# Patient Record
Sex: FEMALE | Race: WHITE | Employment: OTHER | URBAN - METROPOLITAN AREA
[De-identification: names, ages, dates, MRNs, and addresses within clinical notes are randomized per-mention and may not be internally consistent; named-entity substitution may affect disease eponyms.]

---

## 2023-08-07 ENCOUNTER — OFFICE VISIT (OUTPATIENT)
Dept: ENT CLINIC | Age: 68
End: 2023-08-07
Payer: MEDICARE

## 2023-08-07 ENCOUNTER — PROCEDURE VISIT (OUTPATIENT)
Dept: AUDIOLOGY | Age: 68
End: 2023-08-07
Payer: MEDICARE

## 2023-08-07 VITALS
HEIGHT: 63 IN | DIASTOLIC BLOOD PRESSURE: 86 MMHG | SYSTOLIC BLOOD PRESSURE: 162 MMHG | WEIGHT: 169 LBS | BODY MASS INDEX: 29.95 KG/M2 | TEMPERATURE: 97.8 F | HEART RATE: 75 BPM

## 2023-08-07 DIAGNOSIS — H90.3 BILATERAL HIGH FREQUENCY SENSORINEURAL HEARING LOSS: Chronic | ICD-10-CM

## 2023-08-07 DIAGNOSIS — H90.3 SENSORINEURAL HEARING LOSS, BILATERAL: Primary | ICD-10-CM

## 2023-08-07 DIAGNOSIS — R42 DIZZINESS: ICD-10-CM

## 2023-08-07 DIAGNOSIS — H93.13 TINNITUS OF BOTH EARS: ICD-10-CM

## 2023-08-07 DIAGNOSIS — H93.13 SUBJECTIVE TINNITUS OF BOTH EARS: Primary | Chronic | ICD-10-CM

## 2023-08-07 DIAGNOSIS — H93.8X2 SENSATION OF FULLNESS IN LEFT EAR: ICD-10-CM

## 2023-08-07 PROCEDURE — 99203 OFFICE O/P NEW LOW 30 MIN: CPT | Performed by: OTOLARYNGOLOGY

## 2023-08-07 PROCEDURE — 92567 TYMPANOMETRY: CPT

## 2023-08-07 PROCEDURE — 92557 COMPREHENSIVE HEARING TEST: CPT

## 2023-08-07 PROCEDURE — 1123F ACP DISCUSS/DSCN MKR DOCD: CPT | Performed by: OTOLARYNGOLOGY

## 2023-08-07 RX ORDER — THYROID 90 MG/1
TABLET ORAL
COMMUNITY
Start: 2019-11-20

## 2023-08-07 ASSESSMENT — ENCOUNTER SYMPTOMS
SINUS PAIN: 0
RHINORRHEA: 0
SORE THROAT: 0

## 2023-08-07 NOTE — PROGRESS NOTES
Reynold Salcido   1955, 69 y.o. female   [id number]       Referring Provider: Marylu Humphreys MD  Referral Type: In an order in [address] [place] [address]    Reason for Visit: Evaluation of suspected change in hearing, tinnitus, or balance. ADULT AUDIOLOGIC EVALUATION      Reynold Salcido is a 69 y.o. female seen today, 8/7/2023 , for an initial audiologic evaluation. Patient was seen by Marylu Humphreys MD following today's evaluation. AUDIOLOGIC AND OTHER PERTINENT MEDICAL HISTORY:      Reynold Salcido reports having  muffled hearing, tinnitus bilaterally, and left ear fullness and ear ache that started 6 weeks ago. She notes hearing loss has been gradual since onset. Episodes of dizziness started roughly 2 weeks ago and have been lasting hours with spinning sensation, nausea and vomiting. She notes fluctuation in tinnitus and fullness when dizziness occurs. She denied otorrhea, history of noise exposure, history of head trauma, history of ear surgery, and family history of hearing loss    Date: 8/7/2023     IMPRESSIONS:      Today's results revealed sensorineural hearing loss bilaterally with asymmetry >15dB in the left ear at low frequency. Excellent speech understanding when in quiet. Tympanometry indicates normal middle ear function. Right, hypermobile tympanic membrane/eardrum in the left. Discussed test results and implications with patient. Discussed possible benefits of amplification. Discussed use of tinnitus management strategies. Follow medical recommendations of Marylu Humphreys MD.     ASSESSMENT AND FINDINGS:     Otoscopy unremarkable. RIGHT EAR:  Hearing Sensitivity: Normal sloping to severe sensorineural hearing loss   Speech Recognition Threshold: 15 dB HL  Word Recognition: Excellent 100%, based on NU-6 25-word list at 55 dBHL using recorded speech stimuli. Tympanometry: Normal peak pressure and compliance, Type A tympanogram, consistent with normal middle ear function.        LEFT EAR:  Hearing

## 2023-08-07 NOTE — PATIENT INSTRUCTIONS
You should return for a repeat hearing test in 6 and 12 months. You should return if your ears plug up with ear wax causing difficulty hearing or pressure. You should employ the tinnitus masking techniques and strategies we discussed, as needed. Bedside tinnitus masking devices (eg. a white noise machine, noise machine, noise americo on your cell phone, fan on in the room, radio with light music or tuned between stations to white noise static) can be very helpful. You should avoid exposure to excessively high levels of noise/sound and use hearing protection measures we discussed, as needed, if such exposure is unavoidable. I recommend that you use Lipoflavonoid Plus, (use brand name, not generic, for the first six months), for treatment of your tinnitus. This is available \"over the counter\" at your pharmacy. You should take two orally three times a day for the first 60 days, then one orally three times a day thereafter. Take this medication continuously for six months. If you have seen no improvement in your tinnitus after a full six months of use, you should consider cessation of this treatment. NO Q-TIPS IN THE EARS  You should never clean your ears with a Q-tip, cotton tipped applicator, Yessy pin, paper clip, or any other instrument. I recommend only use of one the several ear wax removal kits available \"over the counter\" (eg. [employer] or Murine, or [employer]) if you feel a need to try to remove ear wax. No other methods should be self used for this purpose as there is danger of injury to the ear and risk of irreparable and irreversible permanent hearing loss. Detail Level: Simple Additional Notes: Patient consent was obtained to proceed with the visit and recommended plan of care after discussion of all risks and benefits, including the risks of COVID-19 exposure. Render Risk Assessment In Note?: no Additional Notes: Pt counseled on toothpaste use as she recently switched brands.

## 2024-02-12 ENCOUNTER — PROCEDURE VISIT (OUTPATIENT)
Dept: AUDIOLOGY | Age: 69
End: 2024-02-12
Payer: MEDICARE

## 2024-02-12 ENCOUNTER — OFFICE VISIT (OUTPATIENT)
Dept: ENT CLINIC | Age: 69
End: 2024-02-12
Payer: MEDICARE

## 2024-02-12 VITALS
DIASTOLIC BLOOD PRESSURE: 82 MMHG | BODY MASS INDEX: 29.94 KG/M2 | TEMPERATURE: 98.4 F | OXYGEN SATURATION: 95 % | HEIGHT: 63 IN | HEART RATE: 8 BPM | SYSTOLIC BLOOD PRESSURE: 145 MMHG

## 2024-02-12 DIAGNOSIS — R42 DIZZINESS: ICD-10-CM

## 2024-02-12 DIAGNOSIS — R42 DIZZINESS: Chronic | ICD-10-CM

## 2024-02-12 DIAGNOSIS — H93.8X2 SENSATION OF FULLNESS IN LEFT EAR: ICD-10-CM

## 2024-02-12 DIAGNOSIS — H90.3 BILATERAL HIGH FREQUENCY SENSORINEURAL HEARING LOSS: Chronic | ICD-10-CM

## 2024-02-12 DIAGNOSIS — H90.3 SENSORINEURAL HEARING LOSS, BILATERAL: Primary | ICD-10-CM

## 2024-02-12 DIAGNOSIS — H93.13 SUBJECTIVE TINNITUS OF BOTH EARS: Primary | Chronic | ICD-10-CM

## 2024-02-12 DIAGNOSIS — H93.13 TINNITUS OF BOTH EARS: ICD-10-CM

## 2024-02-12 PROCEDURE — 99213 OFFICE O/P EST LOW 20 MIN: CPT | Performed by: OTOLARYNGOLOGY

## 2024-02-12 PROCEDURE — 92557 COMPREHENSIVE HEARING TEST: CPT | Performed by: AUDIOLOGIST

## 2024-02-12 PROCEDURE — 92567 TYMPANOMETRY: CPT | Performed by: AUDIOLOGIST

## 2024-02-12 PROCEDURE — 1123F ACP DISCUSS/DSCN MKR DOCD: CPT | Performed by: OTOLARYNGOLOGY

## 2024-02-12 NOTE — PATIENT INSTRUCTIONS
Avoid working at heights, on ladders or scaffolding or near the edges of platforms or using heavy or complicated machinery or operating a motorized vehicle, or any activity where loss of consciousness would be hazardous to yourself or others, if you are experiencing dizziness, and until having been dizzy free for 72 hours. Even then, take appropriate care and precautions as dizziness could occur at any time.    Avoid any motions or activity that results in the off balance sensation.  Stand up or arise slowly and in stages, as we discussed.

## 2024-02-12 NOTE — PROGRESS NOTES
Jessica Osborne   1955, 68 y.o. female   [id number]       Referring Provider: Quincy Caro MD  Referral Type: Referring provider encounter note    Reason for Visit: Evaluation of suspected change in hearing, tinnitus, or balance.      ADULT AUDIOLOGIC EVALUATION      Jessica Osborne is a 68 y.o. female seen today, 2/12/2024, for a recheck audiologic evaluation.      AUDIOLOGIC AND OTHER PERTINENT MEDICAL HISTORY:        Jessica Osborne noted known bilateral sensorineural hearing loss with LE>RE asymmetry noted at 250 Hz and 6000 Hz; she has history of tinnitus bilaterally, left ear fullness, and dizziness.  Since her last appointment, she noted one instance of dizziness on 2/9/2024 with increased tinnitus at that time.  She did note she felt the lipo flavonoids have somewhat helped her perception of the tinnitus, however, she still notes the sound is always present.    Jessica Osborne denied otalgia and otorrhea.    IMPRESSIONS:       Today's results are consistent with stable bilateral sensorineural hearing loss, LE>RE at 6000 Hz and slight difference at 250 Hz; both ears with excellent word recognition for soft conversational speech.  Follow medical recommendations from Dr. Caro.    ASSESSMENT AND FINDINGS:       Otoscopy revealed: Clear ear canals bilaterally      RIGHT EAR:  Hearing Sensitivity: Within normal limits to mild through 4000 Hz steeply sloping to severe sensorineural hearing loss.  Speech Recognition Threshold: 15 dBHL  Word Recognition: Excellent (100%), based on NU-6 Ordered-by-Difficulty 10-word list at 45 dBHL using recorded speech stimuli.    Tympanometry: Normal peak pressure and compliance, Type A tympanogram, consistent with normal middle ear function.      LEFT EAR:  Hearing Sensitivity: Mild rising to within normal limits through 2000 Hz, then sloping to mild through 4000 Hz, then steeply sloping to severe sensorineural hearing loss.  Speech Recognition Threshold: 15 dBHL  Word

## 2024-02-12 NOTE — PROGRESS NOTES
CHIEF COMPLAINT  Chief Complaint   Patient presents with    Hearing Loss    Tinnitus       HISTORY OF PRESENT ILLNESS    Jessica Osborne is a 68 y.o. female here for recheck and follow up of tinnitus and hearing loss.  Had one episode of dizziness since the last visit six months ago and was on this past Friday, lasted for about 30 minutes and cleared up.       REVIEW OF SYSTEMS  Constitutional:  Denied fever and chills.  ENT/sinus:  Denied otalgia, otorrhea, nasal pain, rhinorrhea, sore throat, and sinus/facial pain.        EXAMINATION    WDWN, NAD  Ears:  TMs, EACs, mastoids and pinnae were normal.      I have performed a physical exam personally.        IMPRESSION / DIAGNOSES / ORDERS:   Jessica was seen today for hearing loss and tinnitus.    Diagnoses and all orders for this visit:    Subjective tinnitus of both ears    Bilateral high frequency sensorineural hearing loss    Dizziness  Comments:  mild and infrequent         RECOMMENDATIONS / PLAN:   Annual and prn hearing test.  Meclizine 25 mg orally up to three times daily as needed for dizziness. Available over the counter.  Return if symptoms worsen or hearing decreased or, for any further ENT or sinus problems or symptoms.       Patient Instructions   Avoid working at heights, on ladders or scaffolding or near the edges of platforms or using heavy or complicated machinery or operating a motorized vehicle, or any activity where loss of consciousness would be hazardous to yourself or others, if you are experiencing dizziness, and until having been dizzy free for 72 hours. Even then, take appropriate care and precautions as dizziness could occur at any time.    Avoid any motions or activity that results in the off balance sensation.  Stand up or arise slowly and in stages, as we discussed.          Quincy Caro MD    [facility]  Department of Otolaryngology/Head and Neck Surgery  [facility]  [address], [address]  [place], [place]

## 2024-08-29 ENCOUNTER — HOSPITAL ENCOUNTER (OUTPATIENT)
Dept: ULTRASOUND IMAGING | Age: 69
Discharge: HOME | End: 2024-08-29
Attending: INTERNAL MEDICINE
Payer: MEDICARE

## 2024-08-29 DIAGNOSIS — E04.9 NON-TOXIC GOITER: ICD-10-CM

## 2024-08-29 PROCEDURE — 76536 US EXAM OF HEAD AND NECK: CPT

## 2024-11-18 ENCOUNTER — TELEPHONE (OUTPATIENT)
Dept: ENDOCRINOLOGY | Age: 69
End: 2024-11-18

## 2025-02-12 ENCOUNTER — PROCEDURE VISIT (OUTPATIENT)
Dept: AUDIOLOGY | Age: 70
End: 2025-02-12

## 2025-02-12 DIAGNOSIS — H90.3 SENSORINEURAL HEARING LOSS (SNHL) OF BOTH EARS: Primary | ICD-10-CM

## 2025-02-12 DIAGNOSIS — R42 DIZZINESS AND GIDDINESS: ICD-10-CM

## 2025-02-12 NOTE — PROGRESS NOTES
Jessica Osborne   1955, 69 y.o. female   [id number]       Referring Provider: Quincy Caro MD  Referral Type: In an order in Epic    Reason for Visit: Evaluation of suspected change in hearing, tinnitus, or balance.    ADULT AUDIOLOGIC EVALUATION      Jessica Osborne is a 69 y.o. female seen today, 2/12/2025 , for a recheck audiologic evaluation.  Patient was seen by Quincy Caro MD following today's evaluation.    AUDIOLOGIC AND OTHER PERTINENT MEDICAL HISTORY:      Jessica Osborne reports an increase in tinnitus bilaterally since her previous hearing evaluation (2/12/24). Patient denies any perceived changes in hearing.     Patient notes recent symptoms of dizziness lasting several hours in duration. Symptoms includes nausea and vomiting. Symptoms are episodic, and she has had a total of five episodes thus far. Patient denies symptoms of imbalance. Patient has a pertinent history of migraines.     No further changes in otologic symptoms were reported.     Date: 2/12/2025     IMPRESSIONS:      Today's results revealed a bilateral sensorineural hearing loss. Excellent speech understanding when in quiet. Tympanometry indicates normal middle ear function.     Discussed test results and implications with patient. Discussed possible benefits of amplification.  Discussed scheduling a HAE. Discussed use of tinnitus management strategies. Hearing aids recommended at this time.    Recommended follow-up with ENT or her PCP for dizziness and migraines. Patient has previously seen ENTs Quincy Caro MD at [facility] and Cale Agarwal MD at [facility]. Patient reports that she will schedule her follow-up with [facility], per schedule availability. Patient was provided with a copy of her Audiogram at today's visit.     ASSESSMENT AND FINDINGS:     Otoscopy unremarkable.    RIGHT EAR:  Hearing Sensitivity: Normal sloping to Severe Sensorineural hearing loss  Speech Recognition Threshold: 10 dB HL  Word

## 2025-05-20 ENCOUNTER — TELEMEDICINE (OUTPATIENT)
Dept: ENDOCRINOLOGY | Age: 70
End: 2025-05-20
Payer: MEDICARE

## 2025-05-20 ENCOUNTER — TELEPHONE (OUTPATIENT)
Dept: ENDOCRINOLOGY | Age: 70
End: 2025-05-20

## 2025-05-20 DIAGNOSIS — C43.8 MALIGNANT MELANOMA OF OVERLAPPING SITES (HCC): ICD-10-CM

## 2025-05-20 DIAGNOSIS — E06.3 HYPOTHYROIDISM DUE TO HASHIMOTO'S THYROIDITIS: Primary | ICD-10-CM

## 2025-05-20 DIAGNOSIS — Z78.0 POSTMENOPAUSAL: ICD-10-CM

## 2025-05-20 DIAGNOSIS — E55.9 VITAMIN D DEFICIENCY: ICD-10-CM

## 2025-05-20 PROCEDURE — 1123F ACP DISCUSS/DSCN MKR DOCD: CPT | Performed by: INTERNAL MEDICINE

## 2025-05-20 PROCEDURE — G8427 DOCREV CUR MEDS BY ELIG CLIN: HCPCS | Performed by: INTERNAL MEDICINE

## 2025-05-20 PROCEDURE — G8400 PT W/DXA NO RESULTS DOC: HCPCS | Performed by: INTERNAL MEDICINE

## 2025-05-20 PROCEDURE — 1090F PRES/ABSN URINE INCON ASSESS: CPT | Performed by: INTERNAL MEDICINE

## 2025-05-20 PROCEDURE — 3017F COLORECTAL CA SCREEN DOC REV: CPT | Performed by: INTERNAL MEDICINE

## 2025-05-20 PROCEDURE — 99203 OFFICE O/P NEW LOW 30 MIN: CPT | Performed by: INTERNAL MEDICINE

## 2025-05-20 RX ORDER — ACETAMINOPHEN 160 MG
2000 TABLET,DISINTEGRATING ORAL DAILY
COMMUNITY

## 2025-05-20 RX ORDER — THYROID 60 MG/1
60 TABLET ORAL DAILY
Qty: 30 TABLET | Refills: 3
Start: 2025-05-20 | End: 2026-05-20

## 2025-05-20 NOTE — PROGRESS NOTES
[facility]    Interpersonal Safety    Received from [facility], [facility]    Housing/Utilities     Current Outpatient Medications   Medication Sig Dispense Refill    CALCIUM-VITAMIN D PO Take 2 tablets by mouth daily  Calcium 600 mg      vitamin D (VITAMIN D3) 50 MCG (2000 UT) CAPS capsule Take 1 capsule by mouth daily      thyroid (ARMOUR THYROID) 60 MG tablet Take 1 tablet by mouth daily 30 tablet 3    Vitamins-Lipotropics (LIPOFLAVONOID PO) Take by mouth      Multiple Vitamin (MULTIVITAMINS PO) Take by mouth       No current facility-administered medications for this visit.     Allergies   Allergen Reactions    Codeine Nausea And Vomiting           OBJECTIVE:   There were no vitals taken for this visit.  Wt Readings from Last 3 Encounters:   08/07/23 76.7 kg (169 lb)   09/13/18 71.7 kg (158 lb)       Physical Exam:    Constitutional: no acute distress, well appearing and well nourished  Psychiatric: oriented to person, place and time, judgement and insight and normal, recent and remote memory intact and mood and affect are normal  Skin: skin and subcutaneous tissue is normal without visible mass,   Head and Face: visual inspection  of head and face revealed no abnormalities  Eyes: visual inspection showed no lid or conjunctival swelling, erythema or discharge, pupils are normal, equal, round  Ears/Nose: external inspection of ears and nose revealed no abnormalities, hearing is grossly normal  Oropharynx/Mouth/Face: lips, tongue and gums appear  normal with no lesions, the voice quality was normal  Neck: neck appears symmetric, with no visible masses,   Pulmonary: no increased work of breathing or signs of respiratory distress,  Musculoskeletal: normal on inspection    Neurological: normal coordination and normal general cortical function      Lab Review:  No results found for: \"TSH\"  No results found for: \"T4FREE\"

## 2025-05-20 NOTE — TELEPHONE ENCOUNTER
Call from pt wanting to know how long would she need to stop her biotin before doing her thyroid bw?    Please advise   CB# [phone]

## 2025-05-27 DIAGNOSIS — E06.3 HYPOTHYROIDISM DUE TO HASHIMOTO'S THYROIDITIS: ICD-10-CM

## 2025-05-27 DIAGNOSIS — Z78.0 POSTMENOPAUSAL: ICD-10-CM

## 2025-05-27 DIAGNOSIS — E55.9 VITAMIN D DEFICIENCY: ICD-10-CM

## 2025-05-28 LAB
ALBUMIN SERPL-MCNC: 4.4 G/DL (ref 3.4–5)
ALBUMIN/GLOB SERPL: 1.8 {RATIO} (ref 1.1–2.2)
ALP SERPL-CCNC: 103 U/L (ref 40–129)
ALT SERPL-CCNC: 24 U/L (ref 10–40)
ANION GAP SERPL CALCULATED.3IONS-SCNC: 13 MMOL/L (ref 3–16)
AST SERPL-CCNC: 24 U/L (ref 15–37)
BILIRUB SERPL-MCNC: <0.2 MG/DL (ref 0–1)
BUN SERPL-MCNC: 18 MG/DL (ref 7–20)
CALCIUM SERPL-MCNC: 9.5 MG/DL (ref 8.3–10.6)
CHLORIDE SERPL-SCNC: 104 MMOL/L (ref 99–110)
CO2 SERPL-SCNC: 23 MMOL/L (ref 21–32)
CREAT SERPL-MCNC: 0.8 MG/DL (ref 0.6–1.2)
GFR SERPLBLD CREATININE-BSD FMLA CKD-EPI: 78 ML/MIN/{1.73_M2}
GLUCOSE SERPL-MCNC: 145 MG/DL (ref 70–99)
POTASSIUM SERPL-SCNC: 4 MMOL/L (ref 3.5–5.1)
PROT SERPL-MCNC: 6.8 G/DL (ref 6.4–8.2)
SODIUM SERPL-SCNC: 140 MMOL/L (ref 136–145)
T3 SERPL-MCNC: 1.18 NG/ML (ref 0.8–2)
THYROGLOB AB SERPL IA-ACNC: 210 IU/ML
THYROPEROXIDASE AB SERPL IA-ACNC: <9 IU/ML
TSH SERPL DL<=0.005 MIU/L-ACNC: 1.69 UIU/ML (ref 0.27–4.2)

## 2025-06-03 ENCOUNTER — HOSPITAL ENCOUNTER (OUTPATIENT)
Dept: GENERAL RADIOLOGY | Age: 70
Discharge: HOME | End: 2025-06-03
Attending: INTERNAL MEDICINE
Payer: MEDICARE

## 2025-06-03 DIAGNOSIS — E55.9 VITAMIN D DEFICIENCY: ICD-10-CM

## 2025-06-03 DIAGNOSIS — E06.3 HYPOTHYROIDISM DUE TO HASHIMOTO'S THYROIDITIS: ICD-10-CM

## 2025-06-03 DIAGNOSIS — Z78.0 POSTMENOPAUSAL: ICD-10-CM

## 2025-06-03 PROCEDURE — 77080 DXA BONE DENSITY AXIAL: CPT

## 2025-06-05 ENCOUNTER — TELEPHONE (OUTPATIENT)
Dept: ENDOCRINOLOGY | Age: 70
End: 2025-06-05

## 2025-06-05 ENCOUNTER — PATIENT MESSAGE (OUTPATIENT)
Dept: ENDOCRINOLOGY | Age: 70
End: 2025-06-05

## 2025-06-05 RX ORDER — THYROID 60 MG/1
60 TABLET ORAL DAILY
Qty: 30 TABLET | Refills: 3 | Status: SHIPPED | OUTPATIENT
Start: 2025-06-05 | End: 2026-06-05

## 2025-06-05 NOTE — TELEPHONE ENCOUNTER
I’m a new patient with Dr. Son and had a virtual appointment with her two weeks ago. She ordered blood work from me which I had done over a week ago and got my results through my chart but have not yet heard back from the doctors office. I will be out of my thyroid medication soon and will need a refill. She even talked about changing my medication but was going to wait until she reviewed my results.

## 2025-06-19 ENCOUNTER — RESULTS FOLLOW-UP (OUTPATIENT)
Dept: ENDOCRINOLOGY | Age: 70
End: 2025-06-19

## 2025-08-21 DIAGNOSIS — Z78.0 POSTMENOPAUSAL: ICD-10-CM

## 2025-08-21 DIAGNOSIS — E55.9 VITAMIN D DEFICIENCY: ICD-10-CM

## 2025-08-21 DIAGNOSIS — E06.3 HYPOTHYROIDISM DUE TO HASHIMOTO'S THYROIDITIS: ICD-10-CM

## 2025-08-22 LAB — TSH SERPL DL<=0.005 MIU/L-ACNC: 4.78 UIU/ML (ref 0.27–4.2)
